# Patient Record
Sex: MALE | Race: BLACK OR AFRICAN AMERICAN | NOT HISPANIC OR LATINO | Employment: OTHER | URBAN - METROPOLITAN AREA
[De-identification: names, ages, dates, MRNs, and addresses within clinical notes are randomized per-mention and may not be internally consistent; named-entity substitution may affect disease eponyms.]

---

## 2019-11-25 PROBLEM — C91.10 CLL (CHRONIC LYMPHOCYTIC LEUKEMIA): Status: ACTIVE | Noted: 2019-11-25

## 2019-12-09 PROBLEM — I10 HYPERTENSION: Status: ACTIVE | Noted: 2019-12-09

## 2019-12-09 PROBLEM — D64.9 ABSOLUTE ANEMIA: Status: ACTIVE | Noted: 2019-12-09

## 2019-12-10 PROBLEM — N18.9 CHRONIC KIDNEY DISEASE: Status: ACTIVE | Noted: 2019-12-10

## 2021-05-12 ENCOUNTER — PATIENT MESSAGE (OUTPATIENT)
Dept: RESEARCH | Facility: HOSPITAL | Age: 71
End: 2021-05-12

## 2021-08-16 PROBLEM — F17.200 TOBACCO SMOKER WITHIN LAST 12 MONTHS: Status: ACTIVE | Noted: 2021-08-16

## 2021-08-16 PROBLEM — Z12.2 ENCOUNTER FOR SCREENING FOR MALIGNANT NEOPLASM OF RESPIRATORY ORGANS: Status: ACTIVE | Noted: 2021-08-16

## 2022-01-03 ENCOUNTER — PATIENT MESSAGE (OUTPATIENT)
Dept: ADMINISTRATIVE | Facility: OTHER | Age: 72
End: 2022-01-03

## 2022-01-12 ENCOUNTER — TELEPHONE (OUTPATIENT)
Dept: SMOKING CESSATION | Facility: CLINIC | Age: 72
End: 2022-01-12
Payer: COMMERCIAL

## 2022-01-13 ENCOUNTER — CLINICAL SUPPORT (OUTPATIENT)
Dept: SMOKING CESSATION | Facility: CLINIC | Age: 72
End: 2022-01-13
Payer: COMMERCIAL

## 2022-01-13 DIAGNOSIS — F17.200 NICOTINE DEPENDENCE: Primary | ICD-10-CM

## 2022-01-13 PROCEDURE — 99404 PR PREVENT COUNSEL,INDIV,60 MIN: ICD-10-PCS | Mod: S$GLB,,, | Performed by: GENERAL PRACTICE

## 2022-01-13 PROCEDURE — 99404 PREV MED CNSL INDIV APPRX 60: CPT | Mod: S$GLB,,, | Performed by: GENERAL PRACTICE

## 2022-01-13 RX ORDER — IBUPROFEN 200 MG
1 TABLET ORAL DAILY
Qty: 14 PATCH | Refills: 0 | Status: SHIPPED | OUTPATIENT
Start: 2022-01-13 | End: 2022-01-25 | Stop reason: SDUPTHER

## 2022-01-13 NOTE — PROGRESS NOTES
Patient will be participating in weekly tobacco cessation meetings and will begin the prescribed tobacco cessation medication 14 mg patches. FTND of 3 indicates a low level of dependence to tobacco. MIRELLA-D of 2 is perceived as no mental distress/ depression.

## 2022-01-20 ENCOUNTER — CLINICAL SUPPORT (OUTPATIENT)
Dept: SMOKING CESSATION | Facility: CLINIC | Age: 72
End: 2022-01-20
Payer: COMMERCIAL

## 2022-01-20 DIAGNOSIS — F17.200 NICOTINE DEPENDENCE: Primary | ICD-10-CM

## 2022-01-20 PROCEDURE — 99402 PREV MED CNSL INDIV APPRX 30: CPT | Mod: S$GLB,,, | Performed by: GENERAL PRACTICE

## 2022-01-20 PROCEDURE — 99402 PR PREVENT COUNSEL,INDIV,30 MIN: ICD-10-PCS | Mod: S$GLB,,, | Performed by: GENERAL PRACTICE

## 2022-01-20 NOTE — Clinical Note
Spoke with patient at length via phone regarding tobacco cessation progress. Patient remains on prescribed tobacco cessation medication regimen of 14 mg patch without any negative side effects at this time. Patient informed me that he has not had a cigarette since Friday 1/14/22. His quit date is 1/15/22. I commended him on quitting. Patient stated that he feels so much better and his smokers cough is much better. He wanted to quit smoking to save money and for overall health. [place] addiction model, cues/triggers, personal reasons for quitting, medications, goals, quit date. Patient remains on prescribed tobacco cessation medication regimen of 21 mg patch without any negative side effects at this time. The patient denies any abnormal behavioral or mental changes at this time.

## 2022-01-20 NOTE — PROGRESS NOTES
Individual Follow-Up Form    1/20/2022    Quit Date: 1/15/22    Clinical Status of Patient: Outpatient    Length of Service: 30 minutes    Continuing Medication: yes  Patches    Other Medications: none     Target Symptoms: Withdrawal and medication side effects. The following were rated moderate (3) to severe (4) on TCRS:  · Moderate (3): none  · Severe (4): none    Comments: Spoke with patient at length via phone regarding tobacco cessation progress. Patient remains on prescribed tobacco cessation medication regimen of 14 mg patch without any negative side effects at this time. Patient informed me that he has not had a cigarette since Friday 1/14/22. His quit date is 1/15/22. I commended him on quitting. Patient stated that he feels so much better and his smokers cough is much better. He wanted to quit smoking to save money and for overall health. [place] addiction model, cues/triggers, personal reasons for quitting, medications, goals, quit date. Patient remains on prescribed tobacco cessation medication regimen of 21 mg patch without any negative side effects at this time. The patient denies any abnormal behavioral or mental changes at this time.           Diagnosis: F17.200    Next Visit: 2 weeks

## 2022-01-25 ENCOUNTER — TELEPHONE (OUTPATIENT)
Dept: SMOKING CESSATION | Facility: CLINIC | Age: 72
End: 2022-01-25
Payer: COMMERCIAL

## 2022-01-25 DIAGNOSIS — F17.200 NICOTINE DEPENDENCE: ICD-10-CM

## 2022-01-25 RX ORDER — IBUPROFEN 200 MG
1 TABLET ORAL DAILY
Qty: 14 PATCH | Refills: 0 | Status: SHIPPED | OUTPATIENT
Start: 2022-01-25 | End: 2022-02-10

## 2022-02-02 PROBLEM — N18.9 CHRONIC KIDNEY DISEASE: Status: RESOLVED | Noted: 2019-12-10 | Resolved: 2022-02-02

## 2022-02-10 ENCOUNTER — CLINICAL SUPPORT (OUTPATIENT)
Dept: SMOKING CESSATION | Facility: CLINIC | Age: 72
End: 2022-02-10
Payer: COMMERCIAL

## 2022-02-10 DIAGNOSIS — F17.200 NICOTINE DEPENDENCE: Primary | ICD-10-CM

## 2022-02-10 PROCEDURE — 99404 PREV MED CNSL INDIV APPRX 60: CPT | Mod: S$GLB,,, | Performed by: GENERAL PRACTICE

## 2022-02-10 PROCEDURE — 99404 PR PREVENT COUNSEL,INDIV,60 MIN: ICD-10-PCS | Mod: S$GLB,,, | Performed by: GENERAL PRACTICE

## 2022-02-10 RX ORDER — NICOTINE 7MG/24HR
1 PATCH, TRANSDERMAL 24 HOURS TRANSDERMAL DAILY
Qty: 28 PATCH | Refills: 0 | Status: SHIPPED | OUTPATIENT
Start: 2022-02-10 | End: 2022-03-09 | Stop reason: SDUPTHER

## 2022-02-10 NOTE — Clinical Note
Met with patient in clinic at length regarding tobacco cessation progress. Patient remains on prescribed tobacco cessation medication regimen of 14 mg patch without any negative side effects at this time. Decreased to 7mg patches. Patient remains quit since 1/15/22. He is so excited and happy to be saving money. I commended him on remaining quit. Patient stated that he keeps busy by watching tv during the day. He is not having any withdrawals nor cravings. Reviewed strategies, controlling environment, cues, triggers, Introduced high risk situations with preparation interventions, caffeine similarities with withdrawal issues of habit and nicotine, understanding urges, cravings, stress and relaxation.

## 2022-02-10 NOTE — PROGRESS NOTES
Individual Follow-Up Form    2/10/2022    Quit Date: 1/15/22    Clinical Status of Patient: Outpatient    Length of Service: 60 minutes    Continuing Medication: yes  Patches    Other Medications: none     Target Symptoms: Withdrawal and medication side effects. The following were rated moderate (3) to severe (4) on TCRS:  · Moderate (3): none  · Severe (4): none    Comments: Met with patient in clinic at length regarding tobacco cessation progress. Patient remains on prescribed tobacco cessation medication regimen of 14 mg patch without any negative side effects at this time. Decreased to 7mg patches. Patient remains quit since 1/15/22. He is so excited and happy to be saving money. I commended him on remaining quit. Patient stated that he keeps busy by watching tv during the day. He is not having any withdrawals nor cravings. Reviewed strategies, controlling environment, cues, triggers, Introduced high risk situations with preparation interventions, caffeine similarities with withdrawal issues of habit and nicotine, understanding urges, cravings, stress and relaxation.       Diagnosis: F17.200    Next Visit: 2 weeks

## 2022-02-22 DIAGNOSIS — D84.9 IMMUNOSUPPRESSED STATUS: ICD-10-CM

## 2022-03-09 ENCOUNTER — CLINICAL SUPPORT (OUTPATIENT)
Dept: SMOKING CESSATION | Facility: CLINIC | Age: 72
End: 2022-03-09
Payer: COMMERCIAL

## 2022-03-09 DIAGNOSIS — F17.200 NICOTINE DEPENDENCE: Primary | ICD-10-CM

## 2022-03-09 PROCEDURE — 99404 PREV MED CNSL INDIV APPRX 60: CPT | Mod: S$GLB,,, | Performed by: GENERAL PRACTICE

## 2022-03-09 PROCEDURE — 99404 PR PREVENT COUNSEL,INDIV,60 MIN: ICD-10-PCS | Mod: S$GLB,,, | Performed by: GENERAL PRACTICE

## 2022-03-09 RX ORDER — NICOTINE 7MG/24HR
1 PATCH, TRANSDERMAL 24 HOURS TRANSDERMAL DAILY
Qty: 28 PATCH | Refills: 0 | Status: SHIPPED | OUTPATIENT
Start: 2022-03-09 | End: 2022-04-06 | Stop reason: SDUPTHER

## 2022-03-09 NOTE — PROGRESS NOTES
Individual Follow-Up Form    3/9/2022    Quit Date: 1/15/22    Clinical Status of Patient: Outpatient    Length of Service: 60 minutes    Continuing Medication: yes  Patches    Other Medications: none     Target Symptoms: Withdrawal and medication side effects. The following were rated moderate (3) to severe (4) on TCRS:  · Moderate (3): none  · Severe (4): none    Comments: Met with patient in clinic at length regarding tobacco cessation progress. Patient remains on prescribed tobacco cessation medication regimen of one 7 mg patch without any negative side effects at this time. Patient remains quit since 1/15/22. I commended him on remaining quit. Patient stated that he continues to keeps busy by watching tv during the day and searching the Internet for the latest news. He is not having any withdrawals nor cravings. He is concerned about gaining weight so he is watching his sugar intake. He stated once it warms up he plans to get out and walk. Patient stated he has no stressors and does not drink alcohol. Reviewed strategies, habitual behavior, stress, and high risk situations. Introduced stress with addition interventions, relaxation with interventions, nutrition, exercise, weight gain, and the importance of rewarding oneself for accomplishments toward becoming tobacco free. Open discussion of all items with interventions. The patient will continue with  therapy sessions and medication monitoring by CTTS. Prescribed medication management will be by physician. The patient denies any abnormal behavioral or mental changes at this time.        Diagnosis: F17.200    Next Visit: 2 weeks

## 2022-03-09 NOTE — Clinical Note
Met with patient in clinic at length regarding tobacco cessation progress. Patient remains on prescribed tobacco cessation medication regimen of one 7 mg patch without any negative side effects at this time. Patient remains quit since 1/15/22. I commended him on remaining quit. Patient stated that he continues to keeps busy by watching tv during the day and searching the Internet for the latest news. He is not having any withdrawals nor cravings. He is concerned about gaining weight so he is watching his sugar intake. Has no stressors and does not drink alcohol. Reviewed strategies, habitual behavior, stress, and high risk situations. Introduced stress with addition interventions, relaxation with interventions, nutrition, exercise, and weight gain. Open discussion of all items with interventions. The patient will continue with  therapy sessions and medication monitoring by CTTS. Prescribed medication management will be by physician. The patient denies any abnormal behavioral or mental changes at this time.

## 2022-04-06 ENCOUNTER — CLINICAL SUPPORT (OUTPATIENT)
Dept: SMOKING CESSATION | Facility: CLINIC | Age: 72
End: 2022-04-06
Payer: COMMERCIAL

## 2022-04-06 DIAGNOSIS — F17.200 NICOTINE DEPENDENCE: ICD-10-CM

## 2022-04-06 PROCEDURE — 99404 PREV MED CNSL INDIV APPRX 60: CPT | Mod: S$GLB,,, | Performed by: GENERAL PRACTICE

## 2022-04-06 PROCEDURE — 99404 PR PREVENT COUNSEL,INDIV,60 MIN: ICD-10-PCS | Mod: S$GLB,,, | Performed by: GENERAL PRACTICE

## 2022-04-06 RX ORDER — NICOTINE 7MG/24HR
1 PATCH, TRANSDERMAL 24 HOURS TRANSDERMAL DAILY
Qty: 28 PATCH | Refills: 0 | Status: SHIPPED | OUTPATIENT
Start: 2022-04-06 | End: 2022-05-10

## 2022-04-06 NOTE — PROGRESS NOTES
Individual Follow-Up Form    4/6/2022    Quit Date: 1/15/22    Clinical Status of Patient: Outpatient    Length of Service: 60 minutes    Continuing Medication: yes  Patches    Other Medications: none     Target Symptoms: Withdrawal and medication side effects. The following were rated moderate (3) to severe (4) on TCRS:  · Moderate (3): none  · Severe (4): none    Comments: Met with patient in clinic at length regarding tobacco cessation progress. Patient remains on prescribed tobacco cessation medication regimen of one 7 mg patch without any negative side effects at this time. Refilled patches. Patient remains quit since 1/15/22. I commended him on remaining quit. Patient stated that he continues to keeps busy by watching tv during the day and searching the Internet for the latest news. He is not having any withdrawals nor cravings. He has started walking to keep from getting diabetes and watching what he eats. Patient stated he has no stressors and never in high-risk situation since he lives alone. Patient rewarded himself with a new 55 inch tv for not smoking. Reviewed strategies, habitual behavior, stress, and high risk situations. The patient will continue with  therapy sessions and medication monitoring by CTTS. Prescribed medication management will be by physician. The patient denies any abnormal behavioral or mental changes at this time.     Diagnosis: F17.200    Next Visit: 2 weeks

## 2022-04-06 NOTE — Clinical Note
Met with patient in clinic at length regarding tobacco cessation progress. Patient remains on prescribed tobacco cessation medication regimen of one 7 mg patch without any negative side effects at this time. Refilled patches. Patient remains quit since 1/15/22. I commended him on remaining quit. Patient stated that he continues to keeps busy by watching tv during the day and searching the Internet for the latest news. He is not having any withdrawals nor cravings. He has started walking to keep from getting diabetes and watching what he eats. Patient stated he has no stressors and never in high-risk situation since he lives alone. Patient rewarded himself with a new 55 inch tv for not smoking. Reviewed strategies, habitual behavior, stress, and high risk situations. The patient will continue with  therapy sessions and medication monitoring by CTTS. Prescribed medication management will be by physician. The patient denies any abnormal behavioral or mental changes at this time.

## 2022-05-05 ENCOUNTER — CLINICAL SUPPORT (OUTPATIENT)
Dept: SMOKING CESSATION | Facility: CLINIC | Age: 72
End: 2022-05-05
Payer: COMMERCIAL

## 2022-05-05 DIAGNOSIS — F17.200 NICOTINE DEPENDENCE: Primary | ICD-10-CM

## 2022-05-05 PROCEDURE — 99407 PR TOBACCO USE CESSATION INTENSIVE >10 MINUTES: ICD-10-PCS | Mod: S$GLB,,,

## 2022-05-05 PROCEDURE — 99999 PR PBB SHADOW E&M-EST. PATIENT-LVL I: CPT | Mod: PBBFAC,,,

## 2022-05-05 PROCEDURE — 99999 PR PBB SHADOW E&M-EST. PATIENT-LVL I: ICD-10-PCS | Mod: PBBFAC,,,

## 2022-05-05 PROCEDURE — 99407 BEHAV CHNG SMOKING > 10 MIN: CPT | Mod: S$GLB,,,

## 2022-05-05 NOTE — PROGRESS NOTES
Called pt to f/u on his 3 month smoking cessation quit status. Pt stated he remains tobacco free. Congratulated  Him on his  hard work and success. Informed him of benefit period, phone follow ups, and contact information. Will complete smart form  for 3 months and will continue to follow up on  quit #1 episode.

## 2022-05-10 ENCOUNTER — CLINICAL SUPPORT (OUTPATIENT)
Dept: SMOKING CESSATION | Facility: CLINIC | Age: 72
End: 2022-05-10
Payer: COMMERCIAL

## 2022-05-10 DIAGNOSIS — F17.200 NICOTINE DEPENDENCE: Primary | ICD-10-CM

## 2022-05-10 PROCEDURE — 99402 PR PREVENT COUNSEL,INDIV,30 MIN: ICD-10-PCS | Mod: S$GLB,,, | Performed by: GENERAL PRACTICE

## 2022-05-10 PROCEDURE — 99402 PREV MED CNSL INDIV APPRX 30: CPT | Mod: S$GLB,,, | Performed by: GENERAL PRACTICE

## 2022-05-10 NOTE — PROGRESS NOTES
Individual Follow-Up Form    5/10/2022    Quit Date: 1/15/22    Clinical Status of Patient: Outpatient    Length of Service: 30 minutes    Continuing Medication: no    Other Medications: none     Target Symptoms: Withdrawal and medication side effects. The following were  rated moderate (3) to severe (4) on TCRS:  · Moderate (3): none  · Severe (4): none    Comments: Spoke with patient at length via phone regarding tobacco cessation progress. Patient stated that he has discontinued the nicotine patches because he did not want anymore nicotine in his system. He says that he made his mind up and he has quit for good. Patient remains quit since 1/15/22. I commended him on remaining quit. He informed me that he is doing good and not having any withdrawals nor cravings. He mentioned that he has put a few extra pounds from eating sweets and he knows for his diabetes he needs to go to  on Aging to exercise or join a gym so he plans to get that going soon. Patient remains not having stressors and never in high-risk situation since he lives alone. Reviewed strategies, habitual behavior, stress, and high risk situations. The patient would like to discontinue therapy sessions and medication monitoring by CTTS. I informed patient if he slips or relapses to please call to get back into the program.    Diagnosis: F17.200

## 2022-06-28 PROBLEM — D47.2 MGUS (MONOCLONAL GAMMOPATHY OF UNKNOWN SIGNIFICANCE): Status: ACTIVE | Noted: 2022-06-28

## 2022-07-27 ENCOUNTER — TELEPHONE (OUTPATIENT)
Dept: SMOKING CESSATION | Facility: CLINIC | Age: 72
End: 2022-07-27
Payer: COMMERCIAL

## 2022-08-25 ENCOUNTER — TELEPHONE (OUTPATIENT)
Dept: SMOKING CESSATION | Facility: CLINIC | Age: 72
End: 2022-08-25
Payer: COMMERCIAL

## 2023-01-18 ENCOUNTER — TELEPHONE (OUTPATIENT)
Dept: SMOKING CESSATION | Facility: CLINIC | Age: 73
End: 2023-01-18
Payer: COMMERCIAL

## 2023-01-18 NOTE — TELEPHONE ENCOUNTER
1st attempt, patient called in regards to 12 month f/u for quit 1 with Smoking Cessation.  Voicemail not available, no answer on daughters phone.  Patient phone is disconnected.